# Patient Record
Sex: MALE | Race: WHITE | Employment: FULL TIME | URBAN - METROPOLITAN AREA
[De-identification: names, ages, dates, MRNs, and addresses within clinical notes are randomized per-mention and may not be internally consistent; named-entity substitution may affect disease eponyms.]

---

## 2020-11-17 ENCOUNTER — OFFICE VISIT (OUTPATIENT)
Dept: NEUROLOGY | Age: 55
End: 2020-11-17
Payer: COMMERCIAL

## 2020-11-17 VITALS
BODY MASS INDEX: 27.63 KG/M2 | TEMPERATURE: 96.8 F | OXYGEN SATURATION: 96 % | SYSTOLIC BLOOD PRESSURE: 126 MMHG | RESPIRATION RATE: 14 BRPM | HEART RATE: 53 BPM | DIASTOLIC BLOOD PRESSURE: 86 MMHG | WEIGHT: 204 LBS | HEIGHT: 72 IN

## 2020-11-17 DIAGNOSIS — M54.16 RIGHT LUMBAR RADICULOPATHY: Primary | ICD-10-CM

## 2020-11-17 PROCEDURE — 99204 OFFICE O/P NEW MOD 45 MIN: CPT | Performed by: PSYCHIATRY & NEUROLOGY

## 2020-11-17 RX ORDER — TESTOSTERONE CYPIONATE 200 MG/ML
INJECTION INTRAMUSCULAR
COMMUNITY
Start: 2020-09-17

## 2020-11-17 NOTE — PATIENT INSTRUCTIONS
RESULT POLICY If we have ordered testing for you, know that; \"NO NEWS IS GOOD NEWS! \" It is our policy that we no longer call patients with results, nor do we  give test results over the phone. We schedule follow up appointments so that your results can be discussed in person. This allows you to address any questions you have regarding the results. If you choose to go to an imaging center outside of [facility], it is your responsibility to bring imaging report and disc to follow up appointment. If something of concern is revealed on your test, we will contact you to discuss the matter and if needed schedule a sooner follow up appointment. Additionally, results may be found by using the My Chart feature and one of our patient service representatives at the  can give you instructions on how to access this feature to utilize our electronic medical record system. Thank you for your understanding. PRESCRIPTION REFILL POLICY [facility] Statement to Patients April 1, 2014 In an effort to ensure the large volume of patient prescription refills is processed in the most efficient and expeditious manner, we are asking our patients to assist us by calling your Pharmacy for all prescription refills, this will include also your  Mail Order Pharmacy. The pharmacy will contact our office electronically to continue the refill process. Please do not wait until the last minute to call your pharmacy. We need at least 48 hours (2days) to fill prescriptions. We also encourage you to call your pharmacy before going to  your prescription to make sure it is ready. With regard to controlled substance prescription refill requests (narcotic refills) that need to be picked up at our office, we ask your cooperation by providing us with at least 72 hours (3days) notice that you will need a refill. We will not refill narcotic prescription refill requests after 4:00pm on any weekday, Monday through Thursday, or after 2:00pm on Fridays, or on the weekends. We encourage everyone to explore another way of getting your prescription refill request processed using [facility], our patient web portal through our electronic medical record system. [facility] is an efficient and effective way to communicate your medication request directly to the office and  downloadable as an otoniel on your smart phone . [facility] also features a review functionality that allows you to view your medication list as well as leave messages for your physician. Are you ready to get connected? If so please review the attatched instructions or speak to any of our staff to get you set up right away! Thank you so much for your cooperation. Should you have any questions please contact our Practice Administrator.

## 2020-11-17 NOTE — LETTER
11/17/20 Patient: Reyna Cervantes YOB: 1965 Date of Visit: 11/17/2020 Joey Guo MD 
[address] [place] [address] [address] [id number] VIA Facsimile: [phone] Dear Joey Guo MD, Thank you for referring Mr. Reyna Cervantes to [address] [place] [address] for evaluation. My notes for this consultation are attached. If you have questions, please do not hesitate to call me. I look forward to following your patient along with you. Sincerely, Vicki Oquendo MD

## 2020-11-17 NOTE — PROGRESS NOTES
[facility] and [address] at [facility] at [address] [place] [address], [address] [facility] [address] [address] [facility] [address]  [phone] Office  [phone] Facsimile           Referring: Self    No chief complaint on file. 72-year-old self-described ambidextrous gentleman who presents today for evaluation of what he calls severe pain lower back to foot. He notes that he had no inciting factor for this discomfort. His problem started about 7 weeks ago. He awakened with some pain in the low back and buttock region. He thought things would just get better and over the course of time they have actually worsened. He has pain radiating from the low back and buttock down the backside of the leg crossing around to the lateral lower leg into the foot. His foot is numb on the right side. He does not drag the foot. No weakness. He had no injury. He saw orthopedics who gave him some NSAIDs and muscle relaxers and took some plain films and told him to come back if things worsen. Symptoms persisted and got worse and so now he is in physical therapy. Physical therapy has not helped. He has been limited in his activities. He has been unable to cut his grass and do other physical activities. Never had anything like this before. He did sprain his right ankle couple of years ago and that got better with therapy. He has not had any bowel or bladder dysfunction. No weakness. No fever or chills. No rash. No palpitations. No shortness of breath. Review of the electronic medical record finds no recent documentation. Last documentation was 2016 with urology. No neurologic imaging in our system.   Past Medical History:   Diagnosis Date    Erectile dysfunction     Former smoker     Migraine        Past Surgical History:   Procedure Laterality Date    HX VASECTOMY  95       Current Outpatient Medications   Medication Sig Dispense Refill    AXIRON 30 mg/actuation (1.5 mL) slpm 60 mg by TransDERmal route daily. Max Daily Amount: 60 mg. Apply in the morning to each axilla as directed. 1 Bottle 3    AXIRON 30 mg/actuation (1.5 mL) slpm APPLY 1 PUMP EVERY MORNING TO EACH AXILA AS DIRECTED. 1 Bottle 6    multivitamin (ONE A DAY) tablet Take 1 Tab by mouth daily.  SUMAtriptan (IMITREX) 100 mg tablet   1        No Known Allergies    Social History     Tobacco Use    Smoking status: Former Smoker     Types: Cigarettes    Smokeless tobacco: Former User   Substance Use Topics    Alcohol use: Yes     Alcohol/week: 0.0 standard drinks     Comment: Occassionally    Drug use: Not on file       Family History   Problem Relation Age of Onset    Prostate Cancer Father        Review of Systems  Pertinent positives and negatives as noted with remainder of comprehensive review negative      Examination  There were no vitals taken for this visit. Visit Vitals  /86 (BP 1 Location: Left arm, BP Patient Position: Sitting)   Pulse (!) 53   Temp 96.8 °F (36 °C)   Resp 14   Ht 6' (1.829 m)   Wt 92.5 kg (204 lb)   SpO2 96%   BMI 27.67 kg/m²       Pleasant, well appearing. Dress and grooming are appropriate. No scleral icterus is present. Oropharynx is clear. Supple neck without bruit appreciated. Heart regular. Pulses are symmetrical.  No edema in the lower extremities. Neurologically, he is awake, alert, oriented with normal speech, language, and cognition. Visual fields are full to direct confrontational testing. He has sharp disk margins bilaterally. Pupils react bilaterally. He has full versions without nystagmus. Face is symmetrical with symmetrical facial sensation. Tongue and palate are midline. Shoulder shrug is symmetrical. Hearing is intact to conversation. He has normal bulk and tone. There is no abnormal movement. There is no pronation or drift.  He is able to generate full strength in the upper and lower extremities and all muscle groups to direct confrontational testing. Reflexes are symmetrical in the upper and lower extremities bilaterally. No ankle jerks bilaterally. No pathologic reflexes are elicited. He has no ataxia or past pointing. Sensory examination is intact to primary modalities and there is no lateralization of sensation. He is able to ambulate without difficulty    Impression/Plan  42-year-old man with right-sided lumbar radiculopathy refractory to conservative measures and ongoing now for about 2 months despite NSAIDs, physical therapy etc.  We will proceed with MRI of the lumbar spine as well as lower extremity EMG. Follow-up after testing    George Jenkins MD    This note was created using voice recognition software. Despite editing, there may be syntax errors.  This note will not be viewable in [facility]

## 2020-11-17 NOTE — PROGRESS NOTES
Chief Complaint   Patient presents with    New Patient    Leg Pain     ? sciatic pain right low back shooting down leg     Went to ortho had muscle relaxer and antiinflammatories which did not help, now in PT with [facility]

## 2020-11-24 ENCOUNTER — HOSPITAL ENCOUNTER (OUTPATIENT)
Dept: MRI IMAGING | Age: 55
Discharge: HOME | End: 2020-11-24
Attending: PSYCHIATRY & NEUROLOGY
Payer: COMMERCIAL

## 2020-11-24 VITALS — BODY MASS INDEX: 27.09 KG/M2 | WEIGHT: 200 LBS | HEIGHT: 72 IN

## 2020-11-24 DIAGNOSIS — M54.16 RIGHT LUMBAR RADICULOPATHY: ICD-10-CM

## 2020-11-24 PROCEDURE — 74011250636 HC RX REV CODE- 250/636: Performed by: RADIOLOGY

## 2020-11-24 PROCEDURE — A9575 INJ GADOTERATE MEGLUMI 0.1ML: HCPCS | Performed by: RADIOLOGY

## 2020-11-24 PROCEDURE — 72158 MRI LUMBAR SPINE W/O & W/DYE: CPT

## 2020-11-24 RX ORDER — GADOTERATE MEGLUMINE 376.9 MG/ML
18 INJECTION INTRAVENOUS
Status: COMPLETED | OUTPATIENT
Start: 2020-11-24 | End: 2020-11-24

## 2020-11-24 RX ADMIN — GADOTERATE MEGLUMINE 18 ML: 376.9 INJECTION INTRAVENOUS at 10:43

## 2020-11-30 ENCOUNTER — TELEPHONE (OUTPATIENT)
Dept: NEUROLOGY | Age: 55
End: 2020-11-30

## 2020-11-30 DIAGNOSIS — M51.26 HNP (HERNIATED NUCLEUS PULPOSUS), LUMBAR: ICD-10-CM

## 2020-11-30 DIAGNOSIS — M54.16 RIGHT LUMBAR RADICULOPATHY: Primary | ICD-10-CM

## 2020-11-30 NOTE — TELEPHONE ENCOUNTER
S/w pt, notified of result and neurosurgery referral.  Gave number to neurosurgical associates at [phone]. Notified that notes and mri results will be faxed to them and if they have not called by 12/7/20, he may call them. Pt to have EMG done on 12/4/20 to correlate with imaging. Once dictated, will fax to [facility] as well.

## 2020-11-30 NOTE — TELEPHONE ENCOUNTER
----- Message from Sherice Celestin MD sent at 11/30/2020  2:40 PM EST -----  Pls call patient  Tell him he has HNP  Refer to Neurosurgery  ----- Message -----  From: Sidney Madrid Results In  Sent: 11/24/2020   3:56 PM EST  To: Sherice Celestin MD

## 2020-12-11 ENCOUNTER — OFFICE VISIT (OUTPATIENT)
Dept: NEUROLOGY | Age: 55
End: 2020-12-11

## 2020-12-11 VITALS — TEMPERATURE: 97.7 F

## 2020-12-11 DIAGNOSIS — M79.609 PAIN IN EXTREMITY, UNSPECIFIED EXTREMITY: Primary | ICD-10-CM

## 2020-12-11 NOTE — LETTER
2020 11:01 AM 
 
Patient:  Lazara Rogers YOB: 1965 Date of Visit: 2020 Dear Osiris Lazcano, [address] [place] [id number] VIA Facsimile: [phone] Georgina Delgado MD 
[address] [facility] [address] VIA Facsimile: [phone]: Thank you for referring Mr. Yosvany Oates to me for EMG/NCS. EMG/ NCS Report [address] [place] [address], [address] Anayeli FarleyOn license of [facility] Ph: [phone] FAX: [phone] Test Date:  2020 Patient: YOSVANY OATES : 1965 Physician: Richardson Hernández MD  
Sex: Male Height: ' \" Ref Phys: Lazara Paniagua MD  
ID#: [id number] Weight:  lbs. Technician: Brittny Fontenot Patient History / Exam: 
2 months ago, patient woke up with severe R lower back and buttock pain that radiates to the R LE with numbness of the R LE. Patient is coming for radiculopathy evaluation. EMG & NCV Findings: 
Evaluation of the left Fibular motor and the right Fibular motor nerves showed normal distal onset latency (L4.4, R4.0 ms), normal amplitude (L8.1, R5.6 mV), normal conduction velocity (B Fib-Ankle, L46, R51 m/s), and normal conduction velocity (Poplt-B Fib, L59, R56 m/s). The left tibial motor and the right tibial motor nerves showed normal distal onset latency (L5.2, R4.3 ms), normal amplitude (L14.6, R14.0 mV), and normal conduction velocity (Knee-Ankle, L47, R48 m/s). The left Sup Fibular sensory and the right Sup Fibular sensory nerves showed normal distal peak latency (L2.5, R2.4 ms), normal amplitude (L18.9, R24.1 µV), and normal conduction velocity (Lower leg-Lat ankle, L59, R53 m/s). The left sural sensory and the right sural sensory nerves showed normal distal peak latency (L3.6, R3.3 ms) and normal amplitude (L10.1, R17.2 µV). All F Wave latencies were within normal limits.   All F Wave left vs. right side latency differences were within normal limits. All H Reflex left vs. right side latency differences were within normal limits. Needle evaluation of the right extensor digitorum brevis muscle showed slightly increased spontaneous activity, diminished recruitment, and Incr Duration. The right posterior tibialis muscle showed slightly increased spontaneous activity and diminished recruitment. The right gluteus medius muscle showed slightly increased spontaneous activity. All remaining muscles (as indicated in the following table) showed no evidence of electrical instability. Impression: ABNORMAL Extensive electrodiagnostic examination of the right lower extremity, with additional nerve conduction study of the left lower extremity, shows the followin) Intact sensory responses 2) Positive waves noted in the right extensor digitorum brevis, posterior tibialis and gluteus medius muscles. These findings are consistent with an acute right L5 motor radiculopathy. There is no evidence of peripheral neuropathy. Francesca Yeboah MD 
Diplomate, American Board of Psychiatry and Neurology Diplomate, Neuromuscular Medicine Diplomate, [address] [place] [address] Board of Electrodiagnostic Medicine Director, [address] [place] [address] Accredited Laboratory with Exemplary Status Nerve Conduction Studies Anti Sensory Summary Table Stim Site NR Peak (ms) Norm Peak (ms) P-T Amp (µV) Norm P-T Amp Site1 Site2 Dist (cm) Left Sup Fibular Anti Sensory (Lat ankle) Lower leg    2.5 <4.5 18.9 >5 Lower leg Lat ankle 10.0 Right Sup Fibular Anti Sensory (Lat ankle) Lower leg    2.4 <4.5 24.1 >5 Lower leg Lat ankle 10.0 Left Sural Anti Sensory (Lat Mall) Calf    3.6 <4.5 10.1 >4.0 Calf Lat Mall 14.0 Right Sural Anti Sensory (Lat Mall) Calf    3.3 <4.5 17.2 >4.0 Calf Lat Mall 14.0 Motor Summary Table  Stim Site NR Onset (ms) Norm Onset (ms) O-P Amp (mV) Norm O-P Amp Amp (Prev) (%) Site1 Site2 Dist (cm) Triston (m/s) Norm Triston (m/s) Left Fibular Motor (Ext Dig Brev) Ankle    4.4 <6.5 8.1 >2.6 100.0 Ankle Ext Dig Brev 8.0 B Fib    11.6  6.7  82.7 B Fib Ankle 33.0 46 >38 Poplt    13.3  6.9  103.0 Poplt B Fib 10.0 59 >42 Right Fibular Motor (Ext Dig Brev) Ankle    4.0 <6.5 5.6 >2.6 100.0 Ankle Ext Dig Brev 8.0 B Fib    10.5  5.0  89.3 B Fib Ankle 33.0 51 >38 Poplt    12.3  4.8  96.0 Poplt B Fib 10.0 56 >42 Left Tibial Motor (Abd Ryan) Ankle    5.2 <6.1 14.6 >5.3 100.0 Ankle Abd Kee Brev 8.0 Knee    13.8  12.2  83.6 Knee Ankle 40.0 47 >39 Right Tibial Motor (Abd Ryan) Ankle    4.3 <6.1 14.0 >5.3 100.0 Ankle Abd Kee Brev 8.0 Knee    12.6  11.6  82.9 Knee Ankle 40.0 48 >39 F Wave Studies NR F-Lat (ms) Lat Norm (ms) L-R F-Lat (ms) L-R Lat Norm Left Tibial (Mrkrs) (Abd Hallucis) 52.96 <56 1.77 <5.7 Right Tibial (Mrkrs) (Abd Hallucis) 51.19 <56 1.77 <5.7 H Reflex Studies NR H-Lat (ms) L-R H-Lat (ms) L-R Lat Norm Left Tibial (Gastroc) 32.89 0.00 <2.0 Right Tibial (Gastroc) 32.89 0.00 <2.0 EMG Side Muscle Nerve Root Ins Act Fibs Psw Recrt Duration Amp Poly Comment Right Ext Dig Brev Dp Br Peron L5, S1 Nml Nml 1+ Reduced Incr Nml Nml Right AbdHallucis MedPlantar S1-2 Nml Nml Nml Nml Nml Nml Nml Right PostTibialis Tibial L5, S1 Nml Nml 1+ Reduced Nml Nml Nml Right AntTibialis Dp Br Peron L4-5 Nml Nml Nml Nml Nml Nml Nml Right MedGastroc Tibial S1-2 Nml Nml Nml Nml Nml Nml Nml Right VastusLat Femoral L2-4 Nml Nml Nml Nml Nml Nml Nml Right GluteusMed SupGluteal L4-S1 Nml Nml 1+ Nml Nml Nml Nml Right Lower Lumb Parasp Rami L5,S1 Nml Nml Nml Nml Nml Nml Nml Nerve Conduction Studies Anti Sensory Left/Right Comparison Stim Site L Lat (ms) R Lat (ms) L-R Lat (ms) L Amp (µV) R Amp (µV) L-R Amp (%) Site1 Site2 L Triston (m/s) R Triston (m/s) L-R Triston (m/s) Sup Fibular Anti Sensory (Lat ankle) Lower leg 1.7 1.9 0.2 18.9 24.1 21.6 Lower leg Lat ankle 59 53 6 Sural Anti Sensory (Lat Mall) Calf 2.8 2.6 0.2 10.1 17.2 41.3 Calf Lat Mall 50 54 4 Motor Left/Right Comparison Stim Site L Lat (ms) R Lat (ms) L-R Lat (ms) L Amp (mV) R Amp (mV) L-R Amp (%) Site1 Site2 L Triston (m/s) R Triston (m/s) L-R Triston (m/s) Fibular Motor (Ext Dig Brev) Ankle 4.4 4.0 0.4 8.1 5.6 30.9 Ankle Ext Dig Brev     
B Fib 11.6 10.5 1.1 6.7 5.0 25.4 B Fib Ankle 46 51 5 Poplt 13.3 12.3 1.0 6.9 4.8 30.4 Poplt B Fib 59 56 3 Tibial Motor (Abd Ryan) Ankle 5.2 4.3 0.9 14.6 14.0 4.1 Ankle Abd Kee Brev     
Knee 13.8 12.6 1.2 12.2 11.6 4.9 Knee Ankle 47 48 1 Waveforms:

## 2020-12-11 NOTE — LETTER
2020 10:53 AM 
 
Patient:  Alisa Suarez YOB: 1965 Date of Visit: 2020 Dear Krista Pelayo MD 
[address] [address] VIA In Basket: Thank you for referring Mr. Yosvany Oates to me for EMG/NCS. EMG/ NCS Report [id number] [address], [address] 19 Ph: [phone] FAX: [phone] Test Date:  2020 Patient: YOSVANY OATES : 1965 Physician: Tyler Daniels MD  
Sex: Male Height: ' \" Ref Phys: Cruzito Conte MD  
ID#: [id number] Weight:  lbs. Technician: Kike Madrigal Patient History / Exam: 
CC:SCIATIC RT.LEG PAIN. EMG & NCV Findings: 
Evaluation of the left Fibular motor and the right Fibular motor nerves showed normal distal onset latency (L4.4, R4.0 ms), normal amplitude (L8.1, R5.6 mV), normal conduction velocity (B Fib-Ankle, L46, R51 m/s), and normal conduction velocity (Poplt-B Fib, L59, R56 m/s). The left tibial motor and the right tibial motor nerves showed normal distal onset latency (L5.2, R4.3 ms), normal amplitude (L14.6, R14.0 mV), and normal conduction velocity (Knee-Ankle, L47, R48 m/s). The left Sup Fibular sensory and the right Sup Fibular sensory nerves showed normal distal peak latency (L2.5, R2.4 ms), normal amplitude (L18.9, R24.1 µV), and normal conduction velocity (Lower leg-Lat ankle, L59, R53 m/s). The left sural sensory and the right sural sensory nerves showed normal distal peak latency (L3.6, R3.3 ms) and normal amplitude (L10.1, R17.2 µV). All F Wave latencies were within normal limits. All F Wave left vs. right side latency differences were within normal limits. All H Reflex left vs. right side latency differences were within normal limits.    
 
Needle evaluation of the right extensor digitorum brevis muscle showed slightly increased spontaneous activity, diminished recruitment, and Incr Duration. The right posterior tibialis muscle showed slightly increased spontaneous activity and diminished recruitment. The right gluteus medius muscle showed slightly increased spontaneous activity. All remaining muscles (as indicated in the following table) showed no evidence of electrical instability. Impression: 
 
 
 
___________________________ Erica Huddleston MD 
 
 
Nerve Conduction Studies Anti Sensory Summary Table Stim Site NR Peak (ms) Norm Peak (ms) P-T Amp (µV) Norm P-T Amp Site1 Site2 Dist (cm) Left Sup Fibular Anti Sensory (Lat ankle) Lower leg    2.5 <4.5 18.9 >5 Lower leg Lat ankle 10.0 Right Sup Fibular Anti Sensory (Lat ankle) Lower leg    2.4 <4.5 24.1 >5 Lower leg Lat ankle 10.0 Left Sural Anti Sensory (Lat Mall) Calf    3.6 <4.5 10.1 >4.0 Calf Lat Mall 14.0 Right Sural Anti Sensory (Lat Mall) Calf    3.3 <4.5 17.2 >4.0 Calf Lat Mall 14.0 Motor Summary Table Stim Site NR Onset (ms) Norm Onset (ms) O-P Amp (mV) Norm O-P Amp Amp (Prev) (%) Site1 Site2 Dist (cm) Triston (m/s) Norm Triston (m/s) Left Fibular Motor (Ext Dig Brev) Ankle    4.4 <6.5 8.1 >2.6 100.0 Ankle Ext Dig Brev 8.0 B Fib    11.6  6.7  82.7 B Fib Ankle 33.0 46 >38 Poplt    13.3  6.9  103.0 Poplt B Fib 10.0 59 >42 Right Fibular Motor (Ext Dig Brev) Ankle    4.0 <6.5 5.6 >2.6 100.0 Ankle Ext Dig Brev 8.0 B Fib    10.5  5.0  89.3 B Fib Ankle 33.0 51 >38 Poplt    12.3  4.8  96.0 Poplt B Fib 10.0 56 >42 Left Tibial Motor (Abd Ryan) Ankle    5.2 <6.1 14.6 >5.3 100.0 Ankle Abd Kee Brev 8.0 Knee    13.8  12.2  83.6 Knee Ankle 40.0 47 >39 Right Tibial Motor (Abd Ryan) Ankle    4.3 <6.1 14.0 >5.3 100.0 Ankle Abd Kee Brev 8.0 Knee    12.6  11.6  82.9 Knee Ankle 40.0 48 >39 F Wave Studies NR F-Lat (ms) Lat Norm (ms) L-R F-Lat (ms) L-R Lat Norm Left Tibial (Mrkrs) (Abd Hallucis) 52.96 <56 1.77 <5.7 Right Tibial (Mrkrs) (Abd Hallucis) 51.19 <56 1.77 <5.7 H Reflex Studies NR H-Lat (ms) L-R H-Lat (ms) L-R Lat Norm Left Tibial (Gastroc) 32.89 0.00 <2.0 Right Tibial (Gastroc) 32.89 0.00 <2.0 EMG Side Muscle Nerve Root Ins Act Fibs Psw Recrt Duration Amp Poly Comment Right Ext Dig Brev Dp Br Peron L5, S1 Nml Nml 1+ Reduced Incr Nml Nml Right AbdHallucis MedPlantar S1-2 Nml Nml Nml Nml Nml Nml Nml Right PostTibialis Tibial L5, S1 Nml Nml 1+ Reduced Nml Nml Nml Right AntTibialis Dp Br Peron L4-5 Nml Nml Nml Nml Nml Nml Nml Right MedGastroc Tibial S1-2 Nml Nml Nml Nml Nml Nml Nml Right VastusLat Femoral L2-4 Nml Nml Nml Nml Nml Nml Nml Right GluteusMed SupGluteal L4-S1 Nml Nml 1+ Nml Nml Nml Nml Right Lower Lumb Parasp Rami L5,S1 Nml Nml Nml Nml Nml Nml Nml Nerve Conduction Studies Anti Sensory Left/Right Comparison Stim Site L Lat (ms) R Lat (ms) L-R Lat (ms) L Amp (µV) R Amp (µV) L-R Amp (%) Site1 Site2 L Triston (m/s) R Triston (m/s) L-R Triston (m/s) Sup Fibular Anti Sensory (Lat ankle) Lower leg 1.7 1.9 0.2 18.9 24.1 21.6 Lower leg Lat ankle 59 53 6 Sural Anti Sensory (Lat Mall) Calf 2.8 2.6 0.2 10.1 17.2 41.3 Calf Lat Mall 50 54 4 Motor Left/Right Comparison Stim Site L Lat (ms) R Lat (ms) L-R Lat (ms) L Amp (mV) R Amp (mV) L-R Amp (%) Site1 Site2 L Triston (m/s) R Triston (m/s) L-R Triston (m/s) Fibular Motor (Ext Dig Brev) Ankle 4.4 4.0 0.4 8.1 5.6 30.9 Ankle Ext Dig Brev     
B Fib 11.6 10.5 1.1 6.7 5.0 25.4 B Fib Ankle 46 51 5 Poplt 13.3 12.3 1.0 6.9 4.8 30.4 Poplt B Fib 59 56 3 Tibial Motor (Abd Ryan) Ankle 5.2 4.3 0.9 14.6 14.0 4.1 Ankle Abd Kee Brev     
Knee 13.8 12.6 1.2 12.2 11.6 4.9 Knee Ankle 47 48 1 Waveforms:

## 2020-12-11 NOTE — PROCEDURES
EMG/ NCS Report  [facility] - INPATIENT  [address] [facility], [address] [place], [address]   Ph: [phone]   FAX: [phone]/ [phone]  Test Date:  2020    Patient: YOSVANY OATES : 1965 Physician: Coni Becerra MD   Sex: Male Height: ' \" Ref Phys: Kym Talyor MD   ID#: [id number] Weight:  lbs. Technician: Tabitha Das     Patient History / Exam:  2 months ago, patient woke up with severe R lower back and buttock pain that radiates to the R LE with numbness of the R LE. Patient is coming for radiculopathy evaluation. EMG & NCV Findings:  Evaluation of the left Fibular motor and the right Fibular motor nerves showed normal distal onset latency (L4.4, R4.0 ms), normal amplitude (L8.1, R5.6 mV), normal conduction velocity (B Fib-Ankle, L46, R51 m/s), and normal conduction velocity (Poplt-B Fib, L59, R56 m/s). The left tibial motor and the right tibial motor nerves showed normal distal onset latency (L5.2, R4.3 ms), normal amplitude (L14.6, R14.0 mV), and normal conduction velocity (Knee-Ankle, L47, R48 m/s). The left Sup Fibular sensory and the right Sup Fibular sensory nerves showed normal distal peak latency (L2.5, R2.4 ms), normal amplitude (L18.9, R24.1 µV), and normal conduction velocity (Lower leg-Lat ankle, L59, R53 m/s). The left sural sensory and the right sural sensory nerves showed normal distal peak latency (L3.6, R3.3 ms) and normal amplitude (L10.1, R17.2 µV). All F Wave latencies were within normal limits. All F Wave left vs. right side latency differences were within normal limits. All H Reflex left vs. right side latency differences were within normal limits. Needle evaluation of the right extensor digitorum brevis muscle showed slightly increased spontaneous activity, diminished recruitment, and Incr Duration.   The right posterior tibialis muscle showed slightly increased spontaneous activity and diminished recruitment. The right gluteus medius muscle showed slightly increased spontaneous activity. All remaining muscles (as indicated in the following table) showed no evidence of electrical instability. Impression:  ABNORMAL    Extensive electrodiagnostic examination of the right lower extremity, with additional nerve conduction study of the left lower extremity, shows the followin) Intact sensory responses  2) Positive waves noted in the right extensor digitorum brevis, posterior tibialis and gluteus medius muscles. These findings are consistent with an acute right L5 motor radiculopathy. There is no evidence of peripheral neuropathy.          Andrae Dalton MD  Diplomate, American Board of Psychiatry and Neurology  Diplomate, Neuromuscular Medicine  Diplomate, American Board of Electrodiagnostic Medicine  Director, [address] [place] [address] Accredited Laboratory with Exemplary Status          Nerve Conduction Studies  Anti Sensory Summary Table     Stim Site NR Peak (ms) Norm Peak (ms) P-T Amp (µV) Norm P-T Amp Site1 Site2 Dist (cm)   Left Sup Fibular Anti Sensory (Lat ankle)   Lower leg    2.5 <4.5 18.9 >5 Lower leg Lat ankle 10.0   Right Sup Fibular Anti Sensory (Lat ankle)   Lower leg    2.4 <4.5 24.1 >5 Lower leg Lat ankle 10.0   Left Sural Anti Sensory (Lat Mall)   Calf    3.6 <4.5 10.1 >4.0 Calf Lat Mall 14.0   Right Sural Anti Sensory (Lat Mall)   Calf    3.3 <4.5 17.2 >4.0 Calf Lat Mall 14.0     Motor Summary Table     Stim Site NR Onset (ms) Norm Onset (ms) O-P Amp (mV) Norm O-P Amp Amp (Prev) (%) Site1 Site2 Dist (cm) Triston (m/s) Norm Triston (m/s)   Left Fibular Motor (Ext Dig Brev)   Ankle    4.4 <6.5 8.1 >2.6 100.0 Ankle Ext Dig Brev 8.0     B Fib    11.6  6.7  82.7 B Fib Ankle 33.0 46 >38   Poplt    13.3  6.9  103.0 Poplt B Fib 10.0 59 >42   Right Fibular Motor (Ext Dig Brev)   Ankle    4.0 <6.5 5.6 >2.6 100.0 Ankle Ext Dig Brev 8.0     B Fib    10.5  5.0  89.3 B Fib Ankle 33.0 51 >38   Poplt    12.3  4.8 96.0 Poplt B Fib 10.0 56 >42   Left Tibial Motor (Abd Kee Brev)   Ankle    5.2 <6.1 14.6 >5.3 100.0 Ankle Abd Kee Brev 8.0     Knee    13.8  12.2  83.6 Knee Ankle 40.0 47 >39   Right Tibial Motor (Abd Kee Brev)   Ankle    4.3 <6.1 14.0 >5.3 100.0 Ankle Abd Kee Brev 8.0     Knee    12.6  11.6  82.9 Knee Ankle 40.0 48 >39     F Wave Studies     NR F-Lat (ms) Lat Norm (ms) L-R F-Lat (ms) L-R Lat Norm   Left Tibial (Mrkrs) (Abd Hallucis)      52.96 <56 1.77 <5.7   Right Tibial (Mrkrs) (Abd Hallucis)      51.19 <56 1.77 <5.7     H Reflex Studies     NR H-Lat (ms) L-R H-Lat (ms) L-R Lat Norm   Left Tibial (Gastroc)      32.89 0.00 <2.0   Right Tibial (Gastroc)      32.89 0.00 <2.0     EMG     Side Muscle Nerve Root Ins Act Fibs Psw Recrt Duration Amp Poly Comment   Right Ext Dig Brev Dp Br Peron L5, S1 Nml Nml 1+ Reduced Incr Nml Nml    Right AbdHallucis MedPlantar S1-2 Nml Nml Nml Nml Nml Nml Nml    Right PostTibialis Tibial L5, S1 Nml Nml 1+ Reduced Nml Nml Nml    Right AntTibialis Dp Br Peron L4-5 Nml Nml Nml Nml Nml Nml Nml    Right MedGastroc Tibial S1-2 Nml Nml Nml Nml Nml Nml Nml    Right VastusLat Femoral L2-4 Nml Nml Nml Nml Nml Nml Nml    Right GluteusMed SupGluteal L4-S1 Nml Nml 1+ Nml Nml Nml Nml    Right Lower Lumb Parasp Rami L5,S1 Nml Nml Nml Nml Nml Nml Nml                Nerve Conduction Studies  Anti Sensory Left/Right Comparison     Stim Site L Lat (ms) R Lat (ms) L-R Lat (ms) L Amp (µV) R Amp (µV) L-R Amp (%) Site1 Site2 L Triston (m/s) R Triston (m/s) L-R Triston (m/s)   Sup Fibular Anti Sensory (Lat ankle)   Lower leg 1.7 1.9 0.2 18.9 24.1 21.6 Lower leg Lat ankle 59 53 6   Sural Anti Sensory (Lat Mall)   Calf 2.8 2.6 0.2 10.1 17.2 41.3 Calf Lat Mall 50 54 4     Motor Left/Right Comparison     Stim Site L Lat (ms) R Lat (ms) L-R Lat (ms) L Amp (mV) R Amp (mV) L-R Amp (%) Site1 Site2 L Triston (m/s) R Triston (m/s) L-R Triston (m/s)   Fibular Motor (Ext Dig Brev)   Ankle 4.4 4.0 0.4 8.1 5.6 30.9 Ankle Ext Dig Brev      B Fib 11.6 10.5 1.1 6.7 5.0 25.4 B Fib Ankle 46 51 5   Poplt 13.3 12.3 1.0 6.9 4.8 30.4 Poplt B Fib 59 56 3   Tibial Motor (Abd Kee Brev)   Ankle 5.2 4.3 0.9 14.6 14.0 4.1 Ankle Abd Kee Brev      Knee 13.8 12.6 1.2 12.2 11.6 4.9 Knee Ankle 47 48 1         Waveforms:

## 2021-04-07 ENCOUNTER — HOSPITAL ENCOUNTER (OUTPATIENT)
Dept: RADIATION THERAPY | Age: 56
Discharge: HOME | End: 2021-04-07

## 2021-05-03 ENCOUNTER — HOSPITAL ENCOUNTER (OUTPATIENT)
Dept: MRI IMAGING | Age: 56
Discharge: HOME | End: 2021-05-03
Attending: RADIOLOGY
Payer: COMMERCIAL

## 2021-05-03 VITALS — WEIGHT: 195 LBS | BODY MASS INDEX: 26.45 KG/M2

## 2021-05-03 PROCEDURE — A9585 GADOBUTROL INJECTION: HCPCS | Performed by: RADIOLOGY

## 2021-05-03 PROCEDURE — 74011000258 HC RX REV CODE- 258: Performed by: RADIOLOGY

## 2021-05-03 PROCEDURE — 72197 MRI PELVIS W/O & W/DYE: CPT

## 2021-05-03 PROCEDURE — 74011250636 HC RX REV CODE- 250/636: Performed by: RADIOLOGY

## 2021-05-03 RX ORDER — SODIUM CHLORIDE 0.9 % (FLUSH) 0.9 %
10 SYRINGE (ML) INJECTION ONCE
Status: COMPLETED | OUTPATIENT
Start: 2021-05-03 | End: 2021-05-03

## 2021-05-03 RX ADMIN — GADOBUTROL 9 ML: 604.72 INJECTION INTRAVENOUS at 10:17

## 2021-05-03 RX ADMIN — Medication 10 ML: at 10:17

## 2021-05-03 RX ADMIN — SODIUM CHLORIDE 100 ML: 900 INJECTION, SOLUTION INTRAVENOUS at 10:17

## 2021-06-29 ENCOUNTER — HOSPITAL ENCOUNTER (EMERGENCY)
Age: 56
Discharge: HOME | End: 2021-06-29
Attending: EMERGENCY MEDICINE
Payer: COMMERCIAL

## 2021-06-29 VITALS
RESPIRATION RATE: 18 BRPM | HEIGHT: 72 IN | BODY MASS INDEX: 26.41 KG/M2 | DIASTOLIC BLOOD PRESSURE: 82 MMHG | SYSTOLIC BLOOD PRESSURE: 121 MMHG | TEMPERATURE: 97.3 F | OXYGEN SATURATION: 97 % | HEART RATE: 77 BPM | WEIGHT: 195 LBS

## 2021-06-29 DIAGNOSIS — R21 RASH: Primary | ICD-10-CM

## 2021-06-29 PROCEDURE — 99283 EMERGENCY DEPT VISIT LOW MDM: CPT

## 2021-06-29 RX ORDER — PREDNISONE 10 MG/1
10 TABLET ORAL
Qty: 45 TABLET | Refills: 0 | Status: SHIPPED | OUTPATIENT
Start: 2021-06-29 | End: 2021-06-29 | Stop reason: SDUPTHER

## 2021-06-29 RX ORDER — PREDNISONE 10 MG/1
10 TABLET ORAL
Qty: 45 TABLET | Refills: 0 | Status: SHIPPED | OUTPATIENT
Start: 2021-06-29

## 2021-06-29 NOTE — ED PROVIDER NOTES
Jah Valdez is a 50 y.o. male with PMhx of migraine, ED who presents to ED with cc of rash. Pt reports s/p prostatectomy on 6/16/21, started with rash around incision sites 6 days ago which has continued to spread along his torso, back and legs. Notes it is itchy and painful. Called surgeon who told him to attempt to remove surgical glue which he tried without improvement. Has been taking Benadryl and Hydrocortizone cream without improvement. Last Benadryl at noon. Denies F/C. Denies throat swelling, sore throat, hoarseness, facial swelling, CP, SOB. PMHx:migraines, ED   PSHx: Vasectomy, prostatectomy     PCP: Anitha Hartmann DO  Urologist: Dr Luke Orourke    There are no other complaints verbalized at this time. Past Medical History:   Diagnosis Date    Erectile dysfunction     Former smoker     Migraine        Past Surgical History:   Procedure Laterality Date    HX VASECTOMY  82         Family History:   Problem Relation Age of Onset    Prostate Cancer Father        Social History     Socioeconomic History    Marital status:      Spouse name: Not on file    Number of children: Not on file    Years of education: Not on file    Highest education level: Not on file   Occupational History    Not on file   Tobacco Use    Smoking status: Former Smoker     Types: Cigarettes    Smokeless tobacco: Former User   Substance and Sexual Activity    Alcohol use: Yes     Alcohol/week: 0.0 standard drinks     Comment: Occassionally    Drug use: Not on file    Sexual activity: Not on file   Other Topics Concern    Not on file   Social History Narrative    Not on file     Social Determinants of Health     Financial Resource Strain:     Difficulty of Paying Living Expenses:    Food Insecurity:     Worried About Running Out of Food in the Last Year:     [address] in the Last Year:    Transportation Needs:     Lack of Transportation (Medical):      Lack of Transportation (Non-Medical): Physical Activity:     Days of Exercise per Week:     Minutes of Exercise per Session:    Stress:     Feeling of Stress :    Social Connections:     Frequency of Communication with Friends and Family:     Frequency of Social Gatherings with Friends and Family:     Attends Nondenominational Services:     Active Member of Clubs or Organizations:     Attends Club or Organization Meetings:     Marital Status:    Intimate Partner Violence:     Fear of Current or Ex-Partner:     Emotionally Abused:     Physically Abused:     Sexually Abused: ALLERGIES: Patient has no known allergies. Review of Systems   Constitutional: Negative for fever. HENT: Negative for facial swelling, sore throat, trouble swallowing and voice change. Respiratory: Negative for shortness of breath. Cardiovascular: Negative for chest pain. Skin: Positive for rash. All other systems reviewed and are negative. Vitals:    06/29/21 1901   BP: 121/82   Pulse: 77   Resp: 18   Temp: 97.3 °F (36.3 °C)   SpO2: 97%   Weight: 88.5 kg (195 lb)   Height: 6' (1.829 m)            Physical Exam  Vitals and nursing note reviewed. Constitutional:       Appearance: He is not toxic-appearing or diaphoretic. Comments: Afebrile. HENT:      Head: Normocephalic. Right Ear: External ear normal.      Left Ear: External ear normal.   Eyes:      General: No scleral icterus. Conjunctiva/sclera: Conjunctivae normal.   Cardiovascular:      Rate and Rhythm: Normal rate. Pulmonary:      Effort: Pulmonary effort is normal. No respiratory distress. Breath sounds: No wheezing. Abdominal:      General: There is no distension. Musculoskeletal:         General: No swelling or deformity. Cervical back: Normal range of motion. Skin:     General: Skin is warm and dry. Findings: Rash present. Comments: Surgical sites intact without abnormal drainage.  Erythematous hives noted over abdomen, back, noted worse around surgical sites. Neurological:      Mental Status: He is alert and oriented to person, place, and time. Mental status is at baseline. [facility]  ED Course as of Jun 29 1916 Tue Jun 29, 2021 1859 Notes he started with \"rash\" on Wednesday. Had prostate removal on 16th by Dr Tanner Friend. Notes rash started at incision site and has now spread over his body. Denies fevers. Last Benadryl at noon        6:59 PM  I have evaluated the patient as the Provider in Triage. I have reviewed His vital signs and the triage nurse assessment. I have talked with the patient and any available family and advised that I am the provider in triage and have ordered the appropriate study to initiate their work up based on the clinical presentation during my assessment. I have advised that the patient will be accommodated in the Main ED as soon as possible. I have also requested to contact the triage nurse or myself immediately if the patient experiences any changes in their condition during this brief waiting period. Luisito Pineda          [MW]      ED Course User Index  [MW] [place], [place]       Procedures        CONSULT NOTE:   7:59 PM  Dr Aileen Severs spoke with Dr Will Xiao ,   Specialty: Urology  Discussed pt's hx, disposition, and available diagnostic and imaging results. Reviewed care plans. Agreeable with plan for taper course of systemic steroids. VITAL SIGNS:  Vitals:    06/29/21 1901   BP: 121/82   Pulse: 77   Resp: 18   Temp: 97.3 °F (36.3 °C)   SpO2: 97%   Weight: 88.5 kg (195 lb)   Height: 6' (1.829 m)         LABS:  No results found for this or any previous visit (from the past 24 hour(s)). IMAGING:  No orders to display         Medications During Visit:  Medications - No data to display      DECISION MAKING:    Yahir Ortiz is a 50 y.o. male who comes in as above. Afebrile and hemodynamically stable. Notes rash spreading from incision sites. ED attending in to see and evaluate pt with me.  Presentation consistent with contact dermatitis possibly secondary to surgical glue given history and presentation. No noted symptoms indicative of anaphylaxis. Discussed care plan with Urology. Will plan for steroid taper and have close FU with urologist given recent prostatectomy. Will continue benadryl and hydrocortizone at home. We have discussed close return precautions as well as follow up recommendations. Opportunity for questions presented. Pt verbalizes their understanding and agreement with care plan. IMPRESSION:  1. Rash        DISPOSITION:  Discharged      Discharge Medication List as of 6/29/2021  8:16 PM           Follow-up Information     Follow up With Specialties Details Why Contact Info    Clifton Miranda MD Urology Schedule an appointment as soon as possible for a visit  Please have close follow up with your urologist Kraigsvetlana  [phone]      [facility] EMERGENCY DEPT Emergency Medicine Go to  As needed, If symptoms worsen, if throat swelling, fevers, new or other concerning symptoms [address] [place] [address]  [phone]            The patient is asked to follow-up with their primary care provider and any other physicians as above in the next several days. They are to call tomorrow for an appointment. We have discussed strict return precautions and the patient is asked to return promptly for any increased concerns or worsening of symptoms and for return precautions regarding their symptoms today. They can return to this emergency department or any other emergency department. I have discussed with them results as above and presented opportunity for questions. They verbalize their understanding of the aboveand agreement with care plan. Please note that this dictation was completed with See[employer], the computer voice recognition software.   Quite often unanticipated grammatical, syntax, homophones, and other interpretive errors are inadvertently transcribed by the computer software. Please disregard these errors. Please excuse any errors that have escaped final proofreading.

## 2021-06-29 NOTE — ED TRIAGE NOTES
Pt reports he is S/P prostate surgery on 6/16. Reports about 6 days ago he started developing a rash in the area of his incisions that has since gotten progressively worse and spread all over his body with associated pain. Called doctor and referred to the ED for further evaluation. Denies fever or chills. Has been taking benadryl and using hydrocortisone without relief.

## 2023-05-18 RX ORDER — SUMATRIPTAN 100 MG/1
TABLET, FILM COATED ORAL
COMMUNITY
Start: 2016-03-06

## 2023-05-18 RX ORDER — PREDNISONE 10 MG/1
10 TABLET ORAL
COMMUNITY
Start: 2021-06-29

## 2023-05-18 RX ORDER — TESTOSTERONE CYPIONATE 200 MG/ML
INJECTION, SOLUTION INTRAMUSCULAR
COMMUNITY
Start: 2020-09-17